# Patient Record
Sex: MALE | Race: WHITE | URBAN - METROPOLITAN AREA
[De-identification: names, ages, dates, MRNs, and addresses within clinical notes are randomized per-mention and may not be internally consistent; named-entity substitution may affect disease eponyms.]

---

## 2018-10-22 ENCOUNTER — OFFICE VISIT (OUTPATIENT)
Dept: URBAN - METROPOLITAN AREA CLINIC 48 | Facility: CLINIC | Age: 73
End: 2018-10-22
Payer: MEDICARE

## 2018-10-22 DIAGNOSIS — H18.52 EPITHELIAL CORNEAL DYSTROPHY: ICD-10-CM

## 2018-10-22 PROCEDURE — 92083 EXTENDED VISUAL FIELD XM: CPT | Performed by: OPHTHALMOLOGY

## 2018-10-22 PROCEDURE — 92014 COMPRE OPH EXAM EST PT 1/>: CPT | Performed by: OPHTHALMOLOGY

## 2018-10-22 PROCEDURE — 92133 CPTRZD OPH DX IMG PST SGM ON: CPT | Performed by: OPHTHALMOLOGY

## 2018-10-22 RX ORDER — BRIMONIDINE TARTRATE 1.5 MG/ML
0.15 % SOLUTION/ DROPS OPHTHALMIC
Qty: 90 | Refills: 3 | Status: INACTIVE
Start: 2018-10-22 | End: 2018-10-22

## 2018-10-22 RX ORDER — BIMATOPROST 0.1 MG/ML
0.01 % SOLUTION/ DROPS OPHTHALMIC
Qty: 90 | Refills: 3 | Status: INACTIVE
Start: 2018-10-22 | End: 2019-10-29

## 2018-10-22 ASSESSMENT — INTRAOCULAR PRESSURE
OS: 15
OD: 13

## 2018-10-22 NOTE — IMPRESSION/PLAN
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 Plan: Continue using AFTS ou as needed for comfort.  if progression vision loss then refer to Cornea specialist

## 2019-02-18 ENCOUNTER — OFFICE VISIT (OUTPATIENT)
Dept: URBAN - METROPOLITAN AREA CLINIC 48 | Facility: CLINIC | Age: 74
End: 2019-02-18
Payer: MEDICARE

## 2019-02-18 DIAGNOSIS — H40.1131 PRIMARY OPEN-ANGLE GLAUCOMA, BILATERAL, MILD STAGE: Primary | ICD-10-CM

## 2019-02-18 PROCEDURE — 92012 INTRM OPH EXAM EST PATIENT: CPT | Performed by: OPHTHALMOLOGY

## 2019-02-18 ASSESSMENT — INTRAOCULAR PRESSURE
OD: 13
OS: 16

## 2019-10-28 ENCOUNTER — OFFICE VISIT (OUTPATIENT)
Dept: URBAN - METROPOLITAN AREA CLINIC 48 | Facility: CLINIC | Age: 74
End: 2019-10-28
Payer: MEDICARE

## 2019-10-28 DIAGNOSIS — T49.5X5A ADVERSE EFFECT OF OPHTHALMOLOGICAL DRUG, INITIAL ENCOUNTER: ICD-10-CM

## 2019-10-28 DIAGNOSIS — H40.1112 PRIMARY OPEN-ANGLE GLAUCOMA, RIGHT EYE, MODERATE STAGE: Primary | ICD-10-CM

## 2019-10-28 DIAGNOSIS — H40.1124 PRIMARY OPEN-ANGLE GLAUCOMA, LEFT EYE, INDETERMINATE STAGE: ICD-10-CM

## 2019-10-28 PROCEDURE — 92014 COMPRE OPH EXAM EST PT 1/>: CPT | Performed by: OPHTHALMOLOGY

## 2019-10-28 PROCEDURE — 92133 CPTRZD OPH DX IMG PST SGM ON: CPT | Performed by: OPHTHALMOLOGY

## 2019-10-28 PROCEDURE — 92083 EXTENDED VISUAL FIELD XM: CPT | Performed by: OPHTHALMOLOGY

## 2019-10-28 RX ORDER — DORZOLAMIDE HYDROCHLORIDE AND TIMOLOL MALEATE 20; 5 MG/ML; MG/ML
SOLUTION/ DROPS OPHTHALMIC
Qty: 10 | Refills: 0 | Status: INACTIVE
Start: 2019-10-28 | End: 2019-10-29

## 2019-10-28 ASSESSMENT — INTRAOCULAR PRESSURE
OD: 13
OS: 19

## 2019-10-28 NOTE — IMPRESSION/PLAN
Impression: Adverse effect of ophthalmological drug, initial encounter: T49.5X5A. Plan: Patient has developed allergy to Brimonidine in Combigan, will d/c drop.

## 2019-10-28 NOTE — IMPRESSION/PLAN
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 Plan: To use AFT frequently, may need preservative free medications in future.

## 2019-10-28 NOTE — IMPRESSION/PLAN
Impression: Primary open-angle glaucoma, right eye, moderate stage: H40.1112. Plan: Testing demonstrated progression of VF and RNFL thinning OD. IOP were high on previous visits but has improved over past 3 visits. Patient is also developing allergy to Combigan. Continue Lumigan qhs OU, stop Combigan to OU, Start dorzolamide-timolol bid OU. IOP check in 3 weeks.

## 2019-10-28 NOTE — IMPRESSION/PLAN
Impression: Primary open-angle glaucoma, left eye, indeterminate stage: C35.7494. Plan: see other plan, testing stable OS.

## 2019-11-19 ENCOUNTER — OFFICE VISIT (OUTPATIENT)
Dept: URBAN - METROPOLITAN AREA CLINIC 48 | Facility: CLINIC | Age: 74
End: 2019-11-19
Payer: MEDICARE

## 2019-11-19 PROCEDURE — 92012 INTRM OPH EXAM EST PATIENT: CPT | Performed by: OPHTHALMOLOGY

## 2019-11-19 ASSESSMENT — INTRAOCULAR PRESSURE
OD: 16
OS: 19

## 2019-11-19 NOTE — IMPRESSION/PLAN
Impression: Primary open-angle glaucoma, right eye, moderate stage: H40.1112. Plan: Discussed and reviewed diagnosis with patient today, understood by patient, intraocular pressure stable with medication. Continue medications and observe. Importance of compliance with medications and regular follow-up reiterated, will continue to monitor.  Patient to continue Lumigan QHS, Guido/Elias BID OU

## 2020-05-04 ENCOUNTER — OFFICE VISIT (OUTPATIENT)
Dept: URBAN - METROPOLITAN AREA CLINIC 48 | Facility: CLINIC | Age: 75
End: 2020-05-04
Payer: MEDICARE

## 2020-05-04 PROCEDURE — 92012 INTRM OPH EXAM EST PATIENT: CPT | Performed by: OPHTHALMOLOGY

## 2020-05-04 ASSESSMENT — INTRAOCULAR PRESSURE
OS: 20
OD: 19

## 2020-05-04 NOTE — IMPRESSION/PLAN
Impression: Primary open-angle glaucoma, bilateral, mild stage: H40.1131. Plan: Right IOP borderline high, possibly starting to get intolerance with dorzolamide-timolol. IOP check in 6 weeks in [place]. Follow-up in fall.

## 2020-11-04 ENCOUNTER — OFFICE VISIT (OUTPATIENT)
Dept: URBAN - METROPOLITAN AREA CLINIC 48 | Facility: CLINIC | Age: 75
End: 2020-11-04
Payer: MEDICARE

## 2020-11-04 PROCEDURE — 92012 INTRM OPH EXAM EST PATIENT: CPT | Performed by: OPHTHALMOLOGY

## 2020-11-04 RX ORDER — DOXAZOSIN MESYLATE 4 MG/1
4 MG TABLET ORAL
Qty: 0 | Refills: 0 | Status: ACTIVE
Start: 2020-11-04

## 2020-11-04 RX ORDER — BIMATOPROST 0.1 MG/ML
0.01 % SOLUTION/ DROPS OPHTHALMIC
Qty: 7.5 | Refills: 3 | Status: INACTIVE
Start: 2020-11-04 | End: 2021-12-06

## 2020-11-04 ASSESSMENT — INTRAOCULAR PRESSURE
OD: 17
OS: 17

## 2020-11-04 NOTE — IMPRESSION/PLAN
Impression: Primary open-angle glaucoma, bilateral, mild stage: H40.1131. Plan: IOP not at target but better than last visit, continue to monitor for now. 
Continue ocular drops as directed Dorz/Elias BID OU, Lumigan QHS OU

## 2021-03-02 ENCOUNTER — OFFICE VISIT (OUTPATIENT)
Dept: URBAN - METROPOLITAN AREA CLINIC 48 | Facility: CLINIC | Age: 76
End: 2021-03-02
Payer: MEDICARE

## 2021-03-02 DIAGNOSIS — H40.1113 PRIMARY OPEN-ANGLE GLAUCOMA, RIGHT EYE, SEVERE STAGE: Primary | ICD-10-CM

## 2021-03-02 PROCEDURE — 92083 EXTENDED VISUAL FIELD XM: CPT | Performed by: OPHTHALMOLOGY

## 2021-03-02 PROCEDURE — 99214 OFFICE O/P EST MOD 30 MIN: CPT | Performed by: OPHTHALMOLOGY

## 2021-03-02 PROCEDURE — 92133 CPTRZD OPH DX IMG PST SGM ON: CPT | Performed by: OPHTHALMOLOGY

## 2021-03-02 ASSESSMENT — INTRAOCULAR PRESSURE
OD: 15
OS: 15

## 2021-03-02 NOTE — ASSESSMENT/PLAN
Impression: Visual Field - OD: Fair-superior arcuate and paracentral visual field loss; OS: Fair-no glaucomatous visual field defect Plan: see plan

## 2021-03-02 NOTE — IMPRESSION/PLAN
Nephrology Progress Note      Patient: Renzo Demarco               Sex: male            MRN:  [id number]      YOB: 1940      Age:  80 year old           Date: 11/7/2020    Subjective:   11/07/20 : Patient seen and examined earlier today.  Denies further loose stool.  Denies shortness of breath.    PMHx, FHx, SHx, and review of systems reviewed and otherwise unchanged.    Reviewed on 11/7/2020      Objective:     Visit Vitals  /74 (BP Location: LUE - Left upper extremity, Patient Position: Sitting)   Pulse 72   Temp 97.5 °F (36.4 °C) (Oral)   Resp 16   Ht 5' 6\" (1.676 m)   Wt 111.3 kg (245 lb 6 oz)   SpO2 97%   BMI 39.60 kg/m²      I/O last 3 completed shifts:  In: 1310 [P.O.:710; I.V.:600]  Out: 1525 [Urine:1525]    Physical Exam:  General Appearance: Alert, no distress   HEENT:  No oropharyngeal erythema/exudate   Neck: Trachea midline, no adenopathy, no JVD   Lungs:   Decreased breath sounds at the bases bilaterally, respirations unlabored   Heart:  Normal S1 and S2, no murmurs or rub   Gastrointestinal:   Soft, non-tender, bowel sounds active   Musculoskeletal: No edema   Skin: No rashes or lesions   Neuro: Nonfocal   HD Access:      Lab/Data Reviewed:    CBC:   Recent Labs   Lab 11/06/20  0457 11/05/20  0516 11/04/20  1515 07/27/20  0428   WBC 6.1 6.5 6.3 8.4   RBC 3.44* 3.26* 3.31* 3.47*   HGB 10.3* 9.7* 9.9* 10.4*   HCT 32.0* 31.1* 31.6* 33.7*   MCV 93.0 95.4 95.5 97.1   MCHC 32.2 31.2* 31.3* 30.9*   RDW-CV 15.0 15.4* 15.4* 14.9   * 111* 121* 147   LYMPH  --  22 19  --      CMP:  Recent Labs   Lab 11/07/20  0507 11/06/20  0457 11/05/20  0516 11/04/20  1515   SODIUM 140 139 139 138   POTASSIUM 3.7 4.0 3.9 4.3   CHLORIDE 109* 105 108* 104   CO2 26 25 25 30   BUN 82* 105* 130* 136*   CREATININE 1.49* 1.96* 2.43* 2.69*   GLUCOSE 126* 138* 59* 70   ALBUMIN  --   --  2.9* 3.0*   GPT  --   --  27 29   ALKPT  --   --  127* 134*   BILIRUBIN  --   --  0.4 0.3     MAGNESIUM   Date Value Ref  Impression: Primary open-angle glaucoma, right eye, severe stage: H40.1113. Plan: Discussed and reviewed diagnosis with patient today, understood by patient, discussed reviewed VF and OCT with patient today, has severe VF loss right eye wih presence of paracentral scotoma, RNFL is stable. Intraocular pressure at target with medication. Continue medications and observe. Importance of compliance with medications and regular follow-up reiterated, will continue to monitor. Patient to continue dorzolamide timolol to OD only, stop to left eye, see plan 2.  COntinue lumigan qhs OU. IOP check in 6 weeks. Range Status   07/26/2020 2.0 1.7 - 2.4 mg/dL Final   07/23/2020 2.2 1.7 - 2.4 mg/dL Final   05/19/2019 2.1 1.7 - 2.4 mg/dL Final     Comment:     [facility] [id number] [place], [address]       PHOSPHORUS   Date Value Ref Range Status   07/26/2020 2.4 2.4 - 4.7 mg/dL Final   07/23/2020 4.3 2.4 - 4.7 mg/dL Final   08/03/2018 2.9 2.4 - 4.7 mg/dL Final     Comment:     [id number] [place], [address]         Assessment/Plan   1. ARF, with underlying stage IIIb chronic kidney disease  -Steady improvement daily.  -Recommend continuing isotonic IV fluid.  -Will follow strict I's and O's, daily weights and chemistries.  2. Hypokalemia  -supplement as needed  3. Diarrhea  -improving  4. Hypertension with chronic diastolic dysfunction  -controlled and compensated      Reviewed above with floor nurse      Herbert Guerrero MD  11/7/2020  8:04 PM  Office Phone: [phone]  Office Fax: [phone]

## 2021-03-02 NOTE — IMPRESSION/PLAN
Impression: Primary open-angle glaucoma, left eye, mild stage: H40.1121. Plan: See plan 1, IOP below target, no VF loss or RNFL thinning. Suggest reducing drops load on left eye. Stop dorzolamide timolol to the left eye, continue lumigan qhs OU.

## 2021-04-19 ENCOUNTER — OFFICE VISIT (OUTPATIENT)
Dept: URBAN - METROPOLITAN AREA CLINIC 48 | Facility: CLINIC | Age: 76
End: 2021-04-19
Payer: MEDICARE

## 2021-04-19 PROCEDURE — 99213 OFFICE O/P EST LOW 20 MIN: CPT | Performed by: OPHTHALMOLOGY

## 2021-04-19 ASSESSMENT — INTRAOCULAR PRESSURE
OD: 15
OS: 18

## 2021-04-19 NOTE — IMPRESSION/PLAN
Impression: Primary open-angle glaucoma, right eye, moderate stage: H40.1112. Plan: Discussed and reviewed diagnosis with patient today, understood by patient, intraocular pressure stable with medication. Continue medications and observe. Importance of compliance with medications and regular follow-up reiterated, will continue to monitor. Patient to continue dorzolamide-timolol bid OD, Lumigan qhs OU. IOP check in 6 months.

## 2021-10-26 ENCOUNTER — OFFICE VISIT (OUTPATIENT)
Dept: URBAN - METROPOLITAN AREA CLINIC 48 | Facility: CLINIC | Age: 76
End: 2021-10-26
Payer: MEDICARE

## 2021-10-26 DIAGNOSIS — H40.1121 PRIMARY OPEN-ANGLE GLAUCOMA, LEFT EYE, MILD STAGE: ICD-10-CM

## 2021-10-26 PROCEDURE — 99213 OFFICE O/P EST LOW 20 MIN: CPT | Performed by: OPHTHALMOLOGY

## 2021-10-26 ASSESSMENT — INTRAOCULAR PRESSURE
OS: 17
OD: 17

## 2021-10-26 NOTE — IMPRESSION/PLAN
Impression: Primary open-angle glaucoma, right eye, moderate stage: H40.1112. Plan: IOP within target range OD, good range OS. Discussed and gave handout on Durysta implant, patient to think about it Continue: Dorzolamide/Timolol BID OD, Lumigan QHS OU.

## 2022-04-25 ENCOUNTER — OFFICE VISIT (OUTPATIENT)
Dept: URBAN - METROPOLITAN AREA CLINIC 48 | Facility: CLINIC | Age: 77
End: 2022-04-25
Payer: MEDICARE

## 2022-04-25 DIAGNOSIS — H40.1112 PRIMARY OPEN-ANGLE GLAUCOMA, RIGHT EYE, MODERATE STAGE: Primary | ICD-10-CM

## 2022-04-25 DIAGNOSIS — H40.1121 PRIMARY OPEN-ANGLE GLAUCOMA, LEFT EYE, MILD STAGE: ICD-10-CM

## 2022-04-25 PROCEDURE — 92014 COMPRE OPH EXAM EST PT 1/>: CPT | Performed by: OPHTHALMOLOGY

## 2022-04-25 PROCEDURE — 92083 EXTENDED VISUAL FIELD XM: CPT | Performed by: OPHTHALMOLOGY

## 2022-04-25 PROCEDURE — 92133 CPTRZD OPH DX IMG PST SGM ON: CPT | Performed by: OPHTHALMOLOGY

## 2022-04-25 ASSESSMENT — INTRAOCULAR PRESSURE
OS: 18
OD: 16

## 2022-04-25 NOTE — IMPRESSION/PLAN
Impression: Primary open-angle glaucoma, right eye, moderate stage: H40.1112. Prev. discussed and gave handout on Durysta implant, patient to think about it. Plan: IOP within target range OD, good range OS. Continue current regimen: Dorzolamide BID OD, Lumigan QHS OU. 

RTC 6 months for IOP check (short exam)

## 2022-12-12 ENCOUNTER — OFFICE VISIT (OUTPATIENT)
Dept: URBAN - METROPOLITAN AREA CLINIC 48 | Facility: CLINIC | Age: 77
End: 2022-12-12
Payer: MEDICARE

## 2022-12-12 DIAGNOSIS — H40.1112 PRIMARY OPEN-ANGLE GLAUCOMA, RIGHT EYE, MODERATE STAGE: Primary | ICD-10-CM

## 2022-12-12 DIAGNOSIS — H40.1121 PRIMARY OPEN-ANGLE GLAUCOMA, LEFT EYE, MILD STAGE: ICD-10-CM

## 2022-12-12 PROCEDURE — 99213 OFFICE O/P EST LOW 20 MIN: CPT | Performed by: OPHTHALMOLOGY

## 2022-12-12 ASSESSMENT — INTRAOCULAR PRESSURE
OD: 17
OS: 19

## 2022-12-12 NOTE — IMPRESSION/PLAN
Impression: Primary open-angle glaucoma, right eye, moderate stage: H40.1112. Prev. discussed and gave handout on Durysta implant, patient to think about it. Plan: IOP slightly elevated but within acceptable range today, patient to continue current regimen and will monitor for fluctuation Continue current regimen: Dorzolamide BID OD, Lumigan QHS OU.

## 2023-04-12 ENCOUNTER — OFFICE VISIT (OUTPATIENT)
Dept: URBAN - METROPOLITAN AREA CLINIC 48 | Facility: CLINIC | Age: 78
End: 2023-04-12
Payer: MEDICARE

## 2023-04-12 DIAGNOSIS — H40.1112 PRIMARY OPEN-ANGLE GLAUCOMA, RIGHT EYE, MODERATE STAGE: Primary | ICD-10-CM

## 2023-04-12 DIAGNOSIS — H40.1121 PRIMARY OPEN-ANGLE GLAUCOMA, LEFT EYE, MILD STAGE: ICD-10-CM

## 2023-04-12 PROCEDURE — 92133 CPTRZD OPH DX IMG PST SGM ON: CPT | Performed by: OPHTHALMOLOGY

## 2023-04-12 PROCEDURE — 92083 EXTENDED VISUAL FIELD XM: CPT | Performed by: OPHTHALMOLOGY

## 2023-04-12 PROCEDURE — 99214 OFFICE O/P EST MOD 30 MIN: CPT | Performed by: OPHTHALMOLOGY

## 2023-04-12 ASSESSMENT — INTRAOCULAR PRESSURE
OS: 16
OD: 15

## 2023-04-12 NOTE — IMPRESSION/PLAN
Impression: Primary open-angle glaucoma, right eye, moderate stage: H40.1112. Prev. discussed and gave handout on Durysta implant, patient to think about it. Plan: Discussed and reviewed diagnosis with patient today, understood by patient, discussed reviewed VF and OCT with patient today, intraocular pressure stable with medication. Continue medications and observe. Importance of compliance with medications and regular follow-up reiterated, will continue to monitor. Continue: Dorzolamide BID OD, Lumigan QHS OU.

## 2024-02-08 ENCOUNTER — OFFICE VISIT (OUTPATIENT)
Dept: URBAN - METROPOLITAN AREA CLINIC 48 | Facility: CLINIC | Age: 79
End: 2024-02-08
Payer: MEDICARE

## 2024-02-08 DIAGNOSIS — H40.1112 PRIMARY OPEN-ANGLE GLAUCOMA, RIGHT EYE, MODERATE STAGE: Primary | ICD-10-CM

## 2024-02-08 PROCEDURE — 99213 OFFICE O/P EST LOW 20 MIN: CPT | Performed by: OPHTHALMOLOGY

## 2024-02-08 RX ORDER — DORZOLAMIDE HYDROCHLORIDE AND TIMOLOL MALEATE 20; 5 MG/ML; MG/ML
SOLUTION/ DROPS OPHTHALMIC
Qty: 10 | Refills: 5 | Status: ACTIVE
Start: 2024-02-08

## 2024-02-08 RX ORDER — BIMATOPROST 0.1 MG/ML
0.01 % SOLUTION/ DROPS OPHTHALMIC
Qty: 10 | Refills: 3 | Status: ACTIVE
Start: 2024-02-08

## 2024-02-08 ASSESSMENT — INTRAOCULAR PRESSURE
OS: 19
OD: 20

## 2024-11-05 ENCOUNTER — OFFICE VISIT (OUTPATIENT)
Dept: URBAN - METROPOLITAN AREA CLINIC 48 | Facility: CLINIC | Age: 79
End: 2024-11-05
Payer: MEDICARE

## 2024-11-05 DIAGNOSIS — G43.909 MIGRAINE: ICD-10-CM

## 2024-11-05 DIAGNOSIS — H40.1112 PRIMARY OPEN-ANGLE GLAUCOMA, RIGHT EYE, MODERATE STAGE: Primary | ICD-10-CM

## 2024-11-05 DIAGNOSIS — E11.9 TYPE 2 DIABETES MELLITUS W/O COMPLICATION: ICD-10-CM

## 2024-11-05 DIAGNOSIS — H40.1121 PRIMARY OPEN-ANGLE GLAUCOMA, LEFT EYE, MILD STAGE: ICD-10-CM

## 2024-11-05 PROCEDURE — 92133 CPTRZD OPH DX IMG PST SGM ON: CPT | Performed by: OPHTHALMOLOGY

## 2024-11-05 PROCEDURE — 99214 OFFICE O/P EST MOD 30 MIN: CPT | Performed by: OPHTHALMOLOGY

## 2024-11-05 PROCEDURE — 92083 EXTENDED VISUAL FIELD XM: CPT | Performed by: OPHTHALMOLOGY

## 2024-11-05 ASSESSMENT — INTRAOCULAR PRESSURE
OS: 18
OD: 15

## 2025-01-07 NOTE — IMPRESSION/PLAN
Impression: Primary open-angle glaucoma, bilateral, mild stage: H40.1131. Plan: Discussed and reviewed diagnosis with patient today, understood by patient, discussed reviewed VF and OCT with patient today, intraocular pressure stable with medication. Continue medications and observe. Importance of compliance with medications and regular follow-up reiterated, will continue to monitor. Patient to continue Combigan bid ou and Lumigan qhs ou. [No Side Effects] : No side effects [FreeTextEntry4] : Michael was seen today for Haldol Decanoate 150mg IM in the right deltoid muscle. Area cleansed prior to and without signs or symptoms of adverse reaction after administration. Medication education reviewed with Michael he denies any side effects he did report as well as his mother some "concerns about feeling my heart sometimes". He spoke with Dr. Flores about what he was feeling. Please also refer to that note for a mental status. He will return to this clinic on 2/4 to meet with Dr Flores and this writer for injection.

## 2025-05-15 ENCOUNTER — OFFICE VISIT (OUTPATIENT)
Dept: URBAN - METROPOLITAN AREA CLINIC 48 | Facility: CLINIC | Age: 80
End: 2025-05-15
Payer: MEDICARE

## 2025-05-15 DIAGNOSIS — H40.1112 PRIMARY OPEN-ANGLE GLAUCOMA, RIGHT EYE, MODERATE STAGE: Primary | ICD-10-CM

## 2025-05-15 DIAGNOSIS — H40.1121 PRIMARY OPEN-ANGLE GLAUCOMA, LEFT EYE, MILD STAGE: ICD-10-CM

## 2025-05-15 PROCEDURE — 99213 OFFICE O/P EST LOW 20 MIN: CPT | Performed by: OPHTHALMOLOGY

## 2025-05-15 ASSESSMENT — INTRAOCULAR PRESSURE
OS: 16
OD: 15